# Patient Record
Sex: MALE | Race: BLACK OR AFRICAN AMERICAN
[De-identification: names, ages, dates, MRNs, and addresses within clinical notes are randomized per-mention and may not be internally consistent; named-entity substitution may affect disease eponyms.]

---

## 2019-06-25 ENCOUNTER — HOSPITAL ENCOUNTER (EMERGENCY)
Dept: HOSPITAL 25 - UCCORT | Age: 29
Discharge: HOME | End: 2019-06-25
Payer: COMMERCIAL

## 2019-06-25 VITALS — DIASTOLIC BLOOD PRESSURE: 72 MMHG | SYSTOLIC BLOOD PRESSURE: 124 MMHG

## 2019-06-25 DIAGNOSIS — H10.9: Primary | ICD-10-CM

## 2019-06-25 PROCEDURE — 99202 OFFICE O/P NEW SF 15 MIN: CPT

## 2019-06-25 PROCEDURE — G0463 HOSPITAL OUTPT CLINIC VISIT: HCPCS

## 2019-06-25 NOTE — ED
Throat Pain/Nasal Congestion





- HPI Summary


HPI Summary: 





29 yr old male with the complaint of left eye irritation for about four days 

and getting worse with yellow drainage and crusting shut in the am.  No history 

of grinding or recalling any FB inthe eye. No contact lens use.  No cold or 

cough symptoms.  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 





- History of Current Complaint


Chief Complaint: UCEye


Time Seen by Provider: 06/25/19 12:45





- Allergies/Home Medications


Allergies/Adverse Reactions: 


 Allergies











Allergy/AdvReac Type Severity Reaction Status Date / Time


 


No Known Allergies Allergy   Verified 06/25/19 12:29











Home Medications: 


 Home Medications





Ibuprofen TAB* [Advil TAB*] 600 mg PO Q6H PRN 06/25/19 [History Confirmed 06/25/ 19]











PMH/Surg Hx/FS Hx/Imm Hx


Infectious Disease History: No


Infectious Disease History: 


   Denies: Traveled Outside the [place] in Last 30 Days





- Family History


Known Family History: Positive: None





- Social History


Occupation: Employed Full-time


Alcohol Use: Weekly


Substance Use Type: Reports: None


Smoking Status (MU): Never Smoked Tobacco





Review of Systems


Constitutional: Negative


Positive: Drainage, Erythema.  Negative: Photophobia, Blurred Vision, Diplopia


All Other Systems Reviewed And Are Negative: Yes





Physical Exam


Triage Information Reviewed: Yes


Vital Signs On Initial Exam: 


 Initial Vitals











Temp Pulse Resp BP Pulse Ox


 


 98 F   82   14   124/72   100 


 


 06/25/19 12:30  06/25/19 12:30  06/25/19 12:30  06/25/19 12:30  06/25/19 12:30











Vital Signs Reviewed: Yes


Appearance: Positive: Well-Appearing, No Pain Distress


Skin: Positive: Warm, Skin Color Reflects Adequate Perfusion


Head/Face: Positive: Normal Head/Face Inspection


Eyes: Positive: EOMI, Conjunctiva Inflammed - bilateral with the left worse 

with mild chemosis to left side.


ENT: Positive: Normal ENT inspection


Neck: Positive: Nontender


Respiratory/Lung Sounds: Positive: Clear to Auscultation, Breath Sounds Present


Cardiovascular: Positive: RRR.  Negative: Murmur


Abdomen Description: Negative: Distended


Musculoskeletal: Positive: Strength/ROM Intact


Neurological: Positive: Sensory/Motor Intact, Alert, Oriented to Person Place, 

Time, CN Intact II-III, Normal Gait, Speech Normal


Psychiatric: Positive: Normal





Diagnostics





- Vital Signs


 Vital Signs











  Temp Pulse Resp BP Pulse Ox


 


 06/25/19 12:30  98 F  82  14  124/72  100














- Laboratory


Lab Statement: Any lab studies that have been ordered have been reviewed, and 

results considered in the medical decision making process.





EENT Course/Dx





- Course


Course Of Treatment: 29 yr old with conjunctivitis.  Rx sulfa drops.





- Diagnoses


Provider Diagnoses: 


 Conjunctivitis








Discharge





- Sign-Out/Discharge


Documenting (check all that apply): Patient Departure


All imaging exams completed and their final reports reviewed: No Studies





- Discharge Plan


Condition: Good


Disposition: HOME


Prescriptions: 


Sulfacetamide 10 % OPTH.SOL* [Sulamyd 10% Opth*] 1 drop BOTH EYES Q4H #1 btl


Patient Education Materials:  Conjunctivitis (ED)


Referrals: 


No Primary Care Phys,NOPCP [Primary Care Provider] - 


[facility] – [place] PHYSICIAN REFERRAL [Outside]





- Billing Disposition and Condition


Condition: GOOD


Disposition: Home

## 2019-07-03 ENCOUNTER — HOSPITAL ENCOUNTER (EMERGENCY)
Dept: HOSPITAL 25 - UCCORT | Age: 29
Discharge: HOME HEALTH | End: 2019-07-03
Payer: COMMERCIAL

## 2019-07-03 VITALS — DIASTOLIC BLOOD PRESSURE: 79 MMHG | SYSTOLIC BLOOD PRESSURE: 121 MMHG

## 2019-07-03 DIAGNOSIS — H10.9: Primary | ICD-10-CM

## 2019-07-03 PROCEDURE — 99212 OFFICE O/P EST SF 10 MIN: CPT

## 2019-07-03 PROCEDURE — G0463 HOSPITAL OUTPT CLINIC VISIT: HCPCS

## 2019-07-03 NOTE — ED
Throat Pain/Nasal Congestion





- HPI Summary


HPI Summary: 





29 yr old with left eye irritation that persists for 9 days.  The patient has 

not had as much discharge but still has some. The patient was put on sulfa eye 

drops with marginal improvement.  Sent here by his work physical provider, Dr Laws for clearance for his eye.  





- History of Current Complaint


Time Seen by Provider: 07/03/19 12:03





- Allergies/Home Medications


Allergies/Adverse Reactions: 


 Allergies











Allergy/AdvReac Type Severity Reaction Status Date / Time


 


No Known Allergies Allergy   Verified 07/03/19 12:17














PMH/Surg Hx/FS Hx/Imm Hx


Infectious Disease History: 


   Denies: Traveled Outside the [place] in Last 30 Days





- Family History


Known Family History: Positive: None





- Social History


Alcohol Use: Weekly


Substance Use Type: Reports: None


Smoking Status (MU): Never Smoked Tobacco





Review of Systems


Constitutional: Negative


Positive: Drainage, Erythema


All Other Systems Reviewed And Are Negative: Yes





Physical Exam


Triage Information Reviewed: Yes


Vital Signs Reviewed: Yes


Appearance: Positive: Well-Appearing, No Pain Distress


Skin: Positive: Warm, Skin Color Reflects Adequate Perfusion


Eyes: Positive: EOMI, CLAUDIA, Conjunctiva Inflammed


ENT: Positive: TMs normal


Neck: Positive: Nontender


Respiratory/Lung Sounds: Positive: Clear to Auscultation, Breath Sounds Present


Cardiovascular: Positive: RRR.  Negative: Murmur


Abdomen Description: Negative: Distended


Musculoskeletal: Positive: Strength/ROM Intact


Neurological: Positive: Alert, Oriented to Person Place, Time


Psychiatric: Positive: Normal





EENT Course/Dx





- Course


Course Of Treatment: 29 yr old with persisitent conjunctival irriation.  We do 

not have a slit lamp for examination here.  At this point he should be seen by 

provider with slit lamp for further evaluation and exmination.  He is going to 

[facility] for Slit Lamp exam.





- Diagnoses


Provider Diagnoses: 


 Conjunctivitis








Discharge





- Sign-Out/Discharge


Documenting (check all that apply): Patient Departure


All imaging exams completed and their final reports reviewed: No Studies





- Discharge Plan


Condition: Good


Disposition: HOME-RECOMMEND TO ED


Patient Education Materials:  Eye Pain (ED)


Referrals: 


No Primary Care Phys,NOPCP [Primary Care Provider] - 


[facility] [place] PHYSICIAN REFERRAL [Outside]


Carly Swanson MD [Medical Doctor] - 


Additional Instructions: 


You need to go to the ER now for a slit lamp examination and further 

evaluation. 





- Billing Disposition and Condition


Condition: GOOD


Disposition: Home-Recommend to ED

## 2020-02-14 ENCOUNTER — HOSPITAL ENCOUNTER (EMERGENCY)
Dept: HOSPITAL 25 - OHCORT | Age: 30
Discharge: HOME | End: 2020-02-14
Payer: SELF-PAY

## 2020-02-14 DIAGNOSIS — Z02.9: Primary | ICD-10-CM
